# Patient Record
Sex: FEMALE
[De-identification: names, ages, dates, MRNs, and addresses within clinical notes are randomized per-mention and may not be internally consistent; named-entity substitution may affect disease eponyms.]

---

## 2022-12-07 PROBLEM — Z00.00 ENCOUNTER FOR PREVENTIVE HEALTH EXAMINATION: Status: ACTIVE | Noted: 2022-12-07

## 2022-12-08 ENCOUNTER — APPOINTMENT (OUTPATIENT)
Dept: PHYSICAL MEDICINE AND REHAB | Facility: CLINIC | Age: 42
End: 2022-12-08

## 2022-12-08 VITALS
SYSTOLIC BLOOD PRESSURE: 132 MMHG | WEIGHT: 190 LBS | BODY MASS INDEX: 32.44 KG/M2 | HEART RATE: 92 BPM | HEIGHT: 64 IN | DIASTOLIC BLOOD PRESSURE: 86 MMHG | OXYGEN SATURATION: 97 %

## 2022-12-08 PROCEDURE — 99215 OFFICE O/P EST HI 40 MIN: CPT

## 2022-12-08 RX ORDER — FAMOTIDINE 20 MG/1
20 TABLET, FILM COATED ORAL DAILY
Qty: 10 | Refills: 0 | Status: ACTIVE | COMMUNITY
Start: 2022-12-08 | End: 1900-01-01

## 2022-12-08 RX ORDER — MELOXICAM 15 MG/1
15 TABLET ORAL
Qty: 30 | Refills: 0 | Status: ACTIVE | COMMUNITY
Start: 2022-12-08 | End: 1900-01-01

## 2022-12-08 RX ORDER — METHOCARBAMOL 750 MG/1
750 TABLET, FILM COATED ORAL
Qty: 60 | Refills: 1 | Status: ACTIVE | COMMUNITY
Start: 2022-12-08 | End: 1900-01-01

## 2022-12-08 RX ORDER — METHYLPREDNISOLONE 4 MG/1
4 TABLET ORAL
Qty: 2 | Refills: 0 | Status: ACTIVE | COMMUNITY
Start: 2022-12-08 | End: 1900-01-01

## 2022-12-09 RX ORDER — METHYLPREDNISOLONE 4 MG/1
4 TABLET ORAL
Qty: 2 | Refills: 0 | Status: ACTIVE | COMMUNITY
Start: 2022-12-09 | End: 1900-01-01

## 2022-12-16 ENCOUNTER — TRANSCRIPTION ENCOUNTER (OUTPATIENT)
Age: 42
End: 2022-12-16

## 2022-12-16 ENCOUNTER — APPOINTMENT (OUTPATIENT)
Dept: PHYSICAL MEDICINE AND REHAB | Facility: CLINIC | Age: 42
End: 2022-12-16

## 2022-12-16 DIAGNOSIS — M54.17 RADICULOPATHY, LUMBOSACRAL REGION: ICD-10-CM

## 2022-12-16 DIAGNOSIS — R20.2 PARESTHESIA OF SKIN: ICD-10-CM

## 2022-12-16 PROCEDURE — 99443: CPT

## 2022-12-16 NOTE — ASSESSMENT
[FreeTextEntry1] :                                                       Assessment/Plan:\par \par KAYLEIGH DOWELL is a 42 year female with acute radicular low back pain here for initial consultation.\par \par Radiculitis, lumbosacral, L5, Left\par Hip weakness, left > right\par Gait difficulty\par \par - Tiers of treatment and management of above diagnosis(es) were discussed with patient\par - Optimal diet, weight, sleep, and lifestyle management to minimize stress and maximize well being counseling provided\par - Imaging reviewed and discussed with patient\par - Reviewed previous encounter notes from 12/4/2022 Dr. KARSTEN Madrigal (EM/Urgent Care) and 12/6/2022 Dr. IGLESIA Peralta (EM) \par - Patient was advised to start a structured, targeted therapy program 2-3x/wk for 8 wks with goal toward HEP\par - Patient was educated on an appropriate home exercise program, provided with exercise recommendations, all questions answered\par - Patient may trial acetaminophen 1000mg up to TID PRN moderate to severe pain and to decrease average consumption of NSAIDs\par - Patient was prescribed methocarbamol 750mg 1-2 tabs up to TID PRN muscle spasm, informed of sedative potential, advised to avoid driving, taking the subway, or operating heavy machinery, patient displayed a clear understanding of the plan including medication, its purpose and side effects, all questions answered\par - Patient was advised to start Meloxicam 15 mg daily with food/milk for 5-7 days to help with pain and to decrease inflammation, afterwards as needed\par - Patient advised to us a single axis cane for gait stability\par - Patient was advised to apply cool compresses or warm heat to affected regions PRN\par - Radiographs of lumbosacral region ordered today \par \par - Educated about red flag symptoms including (but not limited to) new, worsened, or persistent: fever greater than 100F, bowel or bladder incontinence, bowel obstipation, inability to void urine, urinary leakage, Severe nausea or vomiting, Worsening numbness, worsening tingling/paresthesias, and/or new or progressive motor weakness; advised to seek immediate medical attention at his nearest Emergency department should they experience any of the above\par \par - Patient relates having minimal interest in locally directed treatment of their condition at this time, they were counseled on the role for local treatment as part of the tiers of treatment for their condition, all questions answered\par \par - MRI lumbar spine without contrast is indicated given that the pt has not improved with tylenol, ibuprofen, naproxen, meloxicam, they underwent non-diagnostic radiographic imaging of the region at the ED, and physical therapy/home exercise program>6 weeks. Patient's imaging is medically necessary to outline targets for locally (interventional) directed treatments and/or guide surgical management.\par \par - Follow up in 2-3 weeks after imaging, in person in 2 months to assess their progress\par \par I have personally spent a total of at least 65 minutes preparing, reviewing internal and external records, explaining, counseling, and coordinating care for this patient encounter.\par \par Thank you,  for allowing me to participate in the care of your patient. Please do not hesitate to contact me with questions/concerns.\par \par Hossein Delarosa M.D.\par Sports and Interventional Spine\par Department of Physical Medicine and Rehabilitation \par [facility] \par Email: [email or web address]\par \par [facility]\par Orthopaedic [facility]\par [address]\par [place], [address]th Floor\[place] [address]\[facility] \par Appointments: [phone]\par Fax: [phone]\[facility]

## 2022-12-16 NOTE — HISTORY OF PRESENT ILLNESS
[FreeTextEntry1] : Hossein Delarosa M.D.\par Sports Medicine and Interventional Spine\par Department of Physical Medicine and Rehabilitation \par [facility] \par Email: [email or web address] <[email or web address] \par [facility]\par Orthopaedic [facility]\par [address]\par [place], [address] Floor\[place] [address]\par \par [facility]\par [facility] [place] [facility]\par [address], [address] Floor\[place] [address]\par \par [facility] at \par [facility]\par [address], [address]th Floor\[place] [address]\par \par [facility] at [facility]par [address]\[place] [address]\par \par [facility] [place] \par [facility]\par [address] [facility]\[place] [address]\par \par For [place] Appointments\par Phone: [phone]\par Fax: [phone]\par \par For [place] Appointments\par Phone: [phone]\par Fax: [phone]\par \par \par ----------------------------------------------------------------------------------------------------------------------------------------\par \par PATIENT: KAYLEIGH DOWELL \par MRN: [id number] \par YOB: 1980 \par DATE OF VISIT: 12/08/2022 \par Referred by Unable to Collect PCP\par PCP ADDRESS:\par \par Dec 08, 2022 \par \par \par Dear Dr. GONZALES,REFERRED \par \par Thank you for referring KAYLEIGH DOWELL to my Sports and Interventional Spine practice and office. Enclosed is a copy of the patient's consultation/progress note, which includes my complete assessment and recent studies completed during the patient's evaluation.\par \par If you have questions or have any patients who require nonsurgical, non-opiate management of any sports, spine, or musculoskeletal conditions, please do not hesitate to contact my , Salome Joseph at [phone].\par \par I look forward to taking care of your patients along with you.\par \par Sincerely,\par \par Hossein\par \par Hossein Delarosa MD\par Sports, Interventional Spine, & Regenerative Musculoskeletal Medicine\par [facility] [place] [facility]\par Email: [email or web address]\par \par \par                                                   Initial Consultation:\par CC: back pain\par \par HPI:  This is the first visit to [facility] [place] [facility] and Interventional Spine Practice.  \par \par KAYLEIGH DOWELL presents with the chief complaint as above.  \par \par Initial Hx on 12/08/2022:\par Presents in person to [facility]\par Pain began 12/4/22 without inciting event\par patient had been having some discomfort while cleaning at her job with the [employer] department, hit by ball\par no pain as severe since that event\par The patient’s difficulties began as above 12/4/22\par pain had been over the midline, over the left >> right lumbar region\par patient had also tried so massage of the lumbar spine\par The pain is graded as 10/10\par The pain is described as sharp at times\par The pain is intermittently severe\par The pain radiates in the LEFT LOWER limbs in a L5 distribution\par The patient feels that the pain is overall persistent\par Patient denies other recent fall, MVA, injury, trauma, or accident besides presenting history above\par \par Aggravating: ambulation, prolonged standing, navigating stairs, sit to stand transitional movements, turning in bed, getting out of bed, spine extension, coughing or valsalva (defecation)\par \par Alleviating: rest, sitting breaks, activity modification, pharmacologic treatments\par Meds: denies regular PO pain medications\par Therapy Program: no recent structured targeted therapy program\par HEP: doing HEP regularly\par \par Assoc Sx:\par Reports intermittent numbness, tingling paresthesia in the LOWER limbs in a left L5 distribution\par Denies Focal motor weakness in the upper or lower limbs\par Denies New or worsened bowel or bladder incontinence\par Denies Saddle anesthesia\par Denies Using Orthotic(s)/Supportive devices\par Denies Swelling in the upper/lower extremities\par They also deny frequent tripping, falling\par \par ROS: A 14 point review of systems was completed. Positive findings are pain as described above. The remaining systems negative.\par \par Breast Cancer Surveillance: up to date\par COVID HX: reviewed\par \par Assoc Hx:\par Ambulates without assistive device\par Injection Hx: denies locally directed treatment to the area in question\par Imaging Hx: reviewed\par \par Level of functioning: indep with ambulation, indep with ADLs\par Living Situation: walk up apartment dwelling with steps to enter

## 2022-12-19 PROBLEM — R20.2 PARESTHESIA OF LEFT LEG: Status: ACTIVE | Noted: 2022-12-08

## 2022-12-19 PROBLEM — M54.17 RADICULITIS, LUMBOSACRAL: Status: ACTIVE | Noted: 2022-12-08

## 2022-12-21 ENCOUNTER — APPOINTMENT (OUTPATIENT)
Dept: ORTHOPEDIC SURGERY | Facility: CLINIC | Age: 42
End: 2022-12-21

## 2023-03-31 ENCOUNTER — APPOINTMENT (OUTPATIENT)
Dept: PHYSICAL MEDICINE AND REHAB | Facility: CLINIC | Age: 43
End: 2023-03-31

## 2023-07-27 ENCOUNTER — APPOINTMENT (OUTPATIENT)
Dept: PHYSICAL MEDICINE AND REHAB | Facility: CLINIC | Age: 43
End: 2023-07-27

## 2024-02-07 ENCOUNTER — APPOINTMENT (OUTPATIENT)
Dept: PHYSICAL MEDICINE AND REHAB | Facility: CLINIC | Age: 44
End: 2024-02-07
Payer: MEDICAID

## 2024-02-07 VITALS
WEIGHT: 190 LBS | HEIGHT: 64 IN | OXYGEN SATURATION: 98 % | DIASTOLIC BLOOD PRESSURE: 75 MMHG | SYSTOLIC BLOOD PRESSURE: 123 MMHG | HEART RATE: 68 BPM | BODY MASS INDEX: 32.44 KG/M2

## 2024-02-07 DIAGNOSIS — R29.898 OTHER SYMPTOMS AND SIGNS INVOLVING THE MUSCULOSKELETAL SYSTEM: ICD-10-CM

## 2024-02-07 DIAGNOSIS — M51.9 UNSPECIFIED THORACIC, THORACOLUMBAR AND LUMBOSACRAL INTERVERTEBRAL DISC DISORDER: ICD-10-CM

## 2024-02-07 DIAGNOSIS — M67.959 UNSPECIFIED DISORDER OF SYNOVIUM AND TENDON, UNSPECIFIED THIGH: ICD-10-CM

## 2024-02-07 DIAGNOSIS — M62.830 MUSCLE SPASM OF BACK: ICD-10-CM

## 2024-02-07 DIAGNOSIS — M51.27 OTHER INTERVERTEBRAL DISC DISPLACEMENT, LUMBOSACRAL REGION: ICD-10-CM

## 2024-02-07 DIAGNOSIS — M51.26 OTHER INTERVERTEBRAL DISC DISPLACEMENT, LUMBAR REGION: ICD-10-CM

## 2024-02-07 PROCEDURE — 99215 OFFICE O/P EST HI 40 MIN: CPT

## 2024-03-11 NOTE — DATA REVIEWED
[MRI] : MRI [FreeTextEntry1] : SITE PERFORMED: [facility] SITE PHONE:  [phone] Patient: KAYLEIGH DOWELL YOB: 1980 Phone:  [phone]  MRN: [id number] Acc: [id number] Date of Exam: 12- EXAM:  MRI LUMBAR SPINE WITHOUT CONTRAST HISTORY: Left L4, L5 radiculopathy. Left lower limb pain. Focal weakness left lower limb. Diminished sensation. TECHNIQUE: Sagittal T1, T2 and STIR, and axial T1 and T2 weighted sequences were obtained.   No intravenous contrast was administered. Images were acquired on a 1.2 Tali MRI. FINDINGS: Vertebral body height and marrow signal are normal.   The intervertebral discs demonstrate normal height and signal intensity from T10-11 to L1-2. At L3-4 there is disc desiccation and bulging of the annulus at. At L4-5 there is disc desiccation and a large left central disc extrusion with extruded disc material migrating superiorly almost up to the L3-4 disc. There is marked compression of the thecal sac and left L4 nerve root in the left lateral recess. The extruded material measures maximally 1.4 cm in transverse by 1.0 cm in AP by 2.4 cm in craniocaudal dimension. At L5-S1 the disc is normal in height and signal with intact annulus. There is no intradural lesion.  The conus medullaris is unremarkable. There is no paraspinal mass. IMPRESSION:  Large left sided disc extrusion and that appears to arise from the L4-5 disc with large amount of extruded disc material migrating cephalad almost up to L3-4. There is marked compression of thecal sac and left L4 nerve root in the left lateral recess. Annular bulge at L3-4.

## 2024-03-11 NOTE — PHYSICAL EXAM
[FreeTextEntry1] : Gen: A+O x 3 in NAD Psych: Normal mood and affect. Responds appropriately to commands Eyes: Anicteric. No discharge. EOMI. Resp: Breathing unlabored CV: DP pulses 2+ and equal. No varicosities noted Ext: No c/c/e Skin: No lesions noted  Gait: Non antalgic  +  reciprocating heel to toe able to stand on toes and heels WITH hand holding Tandem gait intact WITH hand holding persistent 02/07/2024 Poor single leg standing balance Able to stand on either lower limb without LOB for 15 seconds Romberg negative  Trendelenburg present with left stance leg  Inspection: Spine alignment is midline. Palpation: There is + tenderness over the midline spinous processes, paravertebral muscles of the thoracolumbar region Lumbar ROM: Flexion, extension, side-bending, rotation, limited in most planes persistent 02/07/2024 +pain with lateral flexion persistent 02/07/2024 +pain with oblique extension persistent 02/07/2024 +pain with lateral rotation   Hip ROM: neg pain at terminal ROM bilaterally. FAIR, FABERE negative bilaterally.  	Hip Flex       Knee Ext      Ankle Dorsi           EHL        Ankle Plantar Right	4/5	        5/5	                 5/5	          5/5	             5/5                            Left	4/5	        5-/5	                 4-/5	          3+/5	             4-/5                             Hip abduction R 4+/5 L 4/5 Hip adduction R 4+/5 L 4/5 Hip extension R 4+/5 L 4/5 Knee Flexion R 4+/5 L 4/5  Tone: Normal. No clonus. Sensation: Grossly intact to light touch bilateral lower limbs. Proprioception: Intact at big toes bilaterally. Reflexes: 2+ symmetric knee jerk, 2+ right ankle jerk 1+ left ankle jerk  Plantars downgoing bilaterally.  SLR negative bilaterally Crossed SLR negative bilaterally. Slump Test negative bilaterally

## 2024-03-11 NOTE — ASSESSMENT
[FreeTextEntry1] :                                                       Assessment/Plan:  KAYLEIGH DOWELL is a 42 year female with acute radicular low back pain here for follow up   Radiculitis, lumbosacral, L5, Left HNP, L4-L5, L5-S1 Intervertebral lumbar disc disorder Spasm of lumbar paraspinous muscles Tendinopathy of gluteus medius, B?L Hip weakness, left > right Gait difficulty  - Tiers of treatment and management of above diagnosis(es) were discussed with patient - Optimal diet, weight, sleep, and lifestyle management to minimize stress and maximize well being counseling provided - Imaging reviewed including previous lumbar spine MRI and discussed with patient - Reviewed previous encounter notes from 12/4/2022 Dr. KARSTEN Madrigal (EM/Urgent Care) and 12/6/2022 Dr. IGLESIA Peralta (EM)  - Feb 07, 2024: Patient was advised to RESUME a structured, targeted therapy program 2-3x/wk for 8 wks with goal toward HEP - Patient was educated on an appropriate home exercise program, provided with exercise recommendations, all questions answered - Patient may trial acetaminophen 1000mg up to TID PRN moderate to severe pain and to decrease average consumption of NSAIDs - Patient was prescribed methocarbamol 750mg 1-2 tabs up to TID PRN muscle spasm, informed of sedative potential, advised to avoid driving, taking the subway, or operating heavy machinery, patient displayed a clear understanding of the plan including medication, its purpose and side effects, all questions answered - Patient was advised to start Meloxicam 15 mg daily with food/milk for 5-7 days to help with pain and to decrease inflammation, afterwards as needed - Patient advised to us a single axis cane for gait stability - Patient was advised to apply cool compresses or warm heat to affected regions PRN - Radiographs of lumbosacral region ordered at initial encounter  - Feb 07, 2024: referral placed for Orthopedic Surgery Spine Surgery for persistent left lower limb paresthesia, pain, and weakness since 2022; importance and time sensitive nature of obtaining (Orthopedic Surgery Spine Surgery consultation was expressed to the patient and  at today's Feb 07, 2024 visit  - Educated about red flag symptoms including (but not limited to) new, worsened, or persistent: fever greater than 100F, bowel or bladder incontinence, bowel obstipation, inability to void urine, urinary leakage, Severe nausea or vomiting, Worsening numbness, worsening tingling/paresthesias, and/or new or progressive motor weakness; advised to seek immediate medical attention at his nearest Emergency department should they experience any of the above  - Patient relates having minimal interest in locally directed treatment of their condition at this time, they were counseled on the role for local treatment as part of the tiers of treatment for their condition, all questions answered  - Feb 07, 2024: MRI lumbar spine without contrast is indicated given that the pt has not improved with tylenol, ibuprofen, naproxen, meloxicam, they underwent non-diagnostic radiographic imaging of the region at the ED, and physical therapy/home exercise program>6 weeks. Patient's imaging is medically necessary to outline targets for locally (interventional) directed treatments and/or guide surgical management.  - Follow up in person as needed following Orthopedic Surgery Spine consultation  I have personally spent a total of at least 40 minutes preparing, reviewing internal and external records, explaining, counseling, and coordinating care for this patient encounter.  Thank you,  for allowing me to participate in the care of your patient. Please do not hesitate to contact me with questions/concerns.  Hossein Delarosa M.D. Sports and Interventional Spine Department of Physical Medicine and Rehabilitation  [facility]  Email: [email or web address] Orthopaedic [facility] [address] [place] [address]  Appointments: [phone] Fax: [phone]

## 2024-03-11 NOTE — HISTORY OF PRESENT ILLNESS
[FreeTextEntry1] : Hossein Delarosa M.D. Sports Medicine and Interventional Spine Department of Physical Medicine and Rehabilitation  [facility] [address], [address] [place] [address]  [facility] [place] [facility] [address], [address] [place] [address]  For [place] Appointments Phone: [phone] Fax: [phone]  ----------------------------------------------------------------------------------------------------------------------------------------  PATIENT: KAYLEIGH DOWELL  MRN: [id number]  YOB: 1980  DATE OF SERVICE: 02/07/2024 PCP ADDRESS:  Feb 07, 2024  Dear    Thank you for referring KAYLEIGH DOWELL to my Sports and Interventional Spine practice and office. Enclosed is a copy of the patient's consultation/progress note, which includes my complete assessment and recent studies completed during the patient's evaluation.  If you have questions or have any patients who require nonsurgical, non-opiate management of any sports, spine, or musculoskeletal conditions, please do not hesitate to contact my , Salome Joseph at [phone].  I look forward to taking care of your patients along with you.  Sincerely,  Hossein Delarosa MD Sports, Interventional Spine, & Regenerative Musculoskeletal Medicine [facility]                                                     Follow Up Visit CC: back pain  HPI:  This is a follow up visit to [facility] [place] [facility] Sports Medicine and Interventional Spine Practice.    KAYLEIGH DOWELL presents with the chief complaint as above.    Interval Hx on Feb 07, 2024: presents for follow up. Since last visit, they have continued to experience pain, left ankle weakness, and left leg numbness/paresthesia. patient has continued intermittent use of the topical lidocaine patches to the low back. patient has also tried OTC topical treatments. patient was able to participate in PT for about three months after our initial visit/telephone. patient has new PT script, plans on resuming PT with [facility]. There have been no significant changes to their aggravating or alleviating factors since the last visit. Pharmacologic treatments now include OTC analgesics PRN, but otherwise pharmacologic treatments are minimal. Denies new or worsened numbness, tingling, or focal motor deficit. Denies interval fall, accident, or injury. Denies change in bowel or bladder functioning. patient notes that they slipped and fell in mud ("covered in leaves"), rolled into the fence, landed on her left low back and right knee.    Meds: denies regular PO pain medications  Therapy Program: no recent structured targeted therapy program HEP: doing HEP regularly  Assoc Sx: Reports intermittent numbness, tingling paresthesia in the LOWER limbs in a left L5 distribution Denies Focal motor weakness in the upper or lower limbs Denies New or worsened bowel or bladder incontinence Denies Saddle anesthesia Denies Using Orthotic(s)/Supportive devices Denies Swelling in the upper/lower extremities They also deny frequent tripping, falling  ROS: A 14 point review of systems was completed. Positive findings are pain as described above. The remaining systems negative.  Breast Cancer Surveillance: up to date COVID HX: reviewed  Interval Hx on Dec 16, 2022: presents for follow up. Since last visit, they underwent further diagnostic workup including additional imaging studies. Patient informed of all relevant imaging findings, all questions were answered; including left sided L4/5 disc extrusion. There have been no significant changes to their aggravating or alleviating factors since the last visit. Pharmacologic treatments now include OTC analgesics PRN, meloxicam, and muscle relaxant, otherwise pharmacologic treatments are minimal. Denies new or worsened numbness, tingling, or focal motor deficit. Denies interval fall, accident, or injury.  Denies change in bowel or bladder functioning. Patient will follow up with our surgical colleagues in the next 1-2 weeks for further evaluation  Initial Hx on 12/08/2022: Presents in person to [facility]. Pain began 12/4/22 without inciting event. patient had been having some discomfort while cleaning at her job with the [employer], hit by ball. no pain as severe since that event. The patient's difficulties began as above 12/4/22. pain had been over the midline, over the left >> right lumbar region. patient had also tried so massage of the lumbar spine. The pain is graded as 10/10. The pain is described as sharp at times. The pain is intermittently severe The pain radiates in the LEFT LOWER limbs in a L5 distribution. The patient feels that the pain is overall persistent. Patient denies other recent fall, MVA, injury, trauma, or accident besides presenting history above. Aggravating: ambulation, prolonged standing, navigating stairs, sit to stand transitional movements, turning in bed, getting out of bed, spine extension, coughing or valsalva (defecation). Alleviating: rest, sitting breaks, activity modification, pharmacologic treatments  Assoc Hx: Ambulates without assistive device Injection Hx: denies locally directed treatment to the area in question Imaging Hx: reviewed  Level of functioning: indep with ambulation, indep with ADLs Living Situation: walk up apartment dwelling with steps to enter
